# Patient Record
Sex: MALE | URBAN - METROPOLITAN AREA
[De-identification: names, ages, dates, MRNs, and addresses within clinical notes are randomized per-mention and may not be internally consistent; named-entity substitution may affect disease eponyms.]

---

## 2017-07-07 ENCOUNTER — APPOINTMENT (OUTPATIENT)
Dept: URBAN - METROPOLITAN AREA CLINIC 282 | Age: 32
Setting detail: DERMATOLOGY
End: 2017-07-09

## 2017-07-07 DIAGNOSIS — L08.0 PYODERMA: ICD-10-CM

## 2017-07-07 PROCEDURE — 99212 OFFICE O/P EST SF 10 MIN: CPT

## 2017-07-07 PROCEDURE — OTHER ORDER TESTS: OTHER

## 2017-07-07 PROCEDURE — OTHER COUNSELING: OTHER

## 2017-07-07 PROCEDURE — OTHER TREATMENT REGIMEN: OTHER

## 2017-07-07 ASSESSMENT — LOCATION DETAILED DESCRIPTION DERM: LOCATION DETAILED: RIGHT ANTERIOR PROXIMAL THIGH

## 2017-07-07 ASSESSMENT — LOCATION ZONE DERM: LOCATION ZONE: LEG

## 2017-07-07 ASSESSMENT — LOCATION SIMPLE DESCRIPTION DERM: LOCATION SIMPLE: RIGHT THIGH

## 2017-07-07 NOTE — HPI: WOUND, LOWER EXTREMITY
Is The Wound New Or Recurrent?: new
How Severe Is It?: severe
How Is Your Wound Healing?: not healing
Is This A New Presentation, Or A Follow-Up?: Lower Extremity Wound

## 2017-07-07 NOTE — PROCEDURE: TREATMENT REGIMEN
Detail Level: Zone
Plan: Wash with Dial soap, cover with dressing\\nCall with C&S results. Consider area contagious until C&S result returns.
Initiate Treatment: Doxycycline 100mg, 1PO bid for 10 days. SED
